# Patient Record
Sex: FEMALE | Race: AMERICAN INDIAN OR ALASKA NATIVE
[De-identification: names, ages, dates, MRNs, and addresses within clinical notes are randomized per-mention and may not be internally consistent; named-entity substitution may affect disease eponyms.]

---

## 2022-07-16 ENCOUNTER — HOSPITAL ENCOUNTER (EMERGENCY)
Dept: HOSPITAL 5 - ED | Age: 52
Discharge: HOME | End: 2022-07-16
Payer: SELF-PAY

## 2022-07-16 VITALS — DIASTOLIC BLOOD PRESSURE: 72 MMHG | SYSTOLIC BLOOD PRESSURE: 121 MMHG

## 2022-07-16 DIAGNOSIS — R51.9: Primary | ICD-10-CM

## 2022-07-16 DIAGNOSIS — Z98.890: ICD-10-CM

## 2022-07-16 DIAGNOSIS — I10: ICD-10-CM

## 2022-07-16 LAB
ALBUMIN SERPL-MCNC: 4.6 G/DL (ref 3.9–5)
ALT SERPL-CCNC: 13 UNITS/L (ref 7–56)
APTT BLD: 29.7 SEC. (ref 24.2–36.6)
BUN SERPL-MCNC: 9 MG/DL (ref 7–17)
BUN/CREAT SERPL: 11 %
CALCIUM SERPL-MCNC: 11.5 MG/DL (ref 8.4–10.2)
HCT VFR BLD CALC: 42.9 % (ref 30.3–42.9)
HEMOLYSIS INDEX: 10
HGB BLD-MCNC: 13.6 GM/DL (ref 10.1–14.3)
INR PPP: 0.93 (ref 0.87–1.13)
MCHC RBC AUTO-ENTMCNC: 32 % (ref 30–34)
MCV RBC AUTO: 78 FL (ref 79–97)
PLATELET # BLD: 234 K/MM3 (ref 140–440)
RBC # BLD AUTO: 5.47 M/MM3 (ref 3.65–5.03)

## 2022-07-16 PROCEDURE — 80053 COMPREHEN METABOLIC PANEL: CPT

## 2022-07-16 PROCEDURE — 99284 EMERGENCY DEPT VISIT MOD MDM: CPT

## 2022-07-16 PROCEDURE — 85730 THROMBOPLASTIN TIME PARTIAL: CPT

## 2022-07-16 PROCEDURE — 70450 CT HEAD/BRAIN W/O DYE: CPT

## 2022-07-16 PROCEDURE — 85610 PROTHROMBIN TIME: CPT

## 2022-07-16 PROCEDURE — 36415 COLL VENOUS BLD VENIPUNCTURE: CPT

## 2022-07-16 PROCEDURE — 85027 COMPLETE CBC AUTOMATED: CPT

## 2022-07-16 NOTE — CAT SCAN REPORT
CT HEAD WITHOUT CONTRAST



INDICATION: headache



TECHNIQUE: All CT scans at this location are performed using CT dose reduction for ALARA by means of 
automated exposure control. 



COMPARISON: 9/18/2010, report unavailable



FINDINGS:



BRAIN: No hemorrhage or mass effect are seen. No evidence of acute infarction is noted.



ORBITS: Normal as visualized.

SOFT TISSUES OF HEAD: Normal.

CALVARIUM: Normal.

VISUALIZED PARANASAL SINUSES AND MASTOID AIR CELLS: Clear.



ADDITIONAL FINDINGS: None.



IMPRESSION: No acute intracranial abnormality.



Signer Name: Buster Meneses MD 

Signed: 7/16/2022 2:42 PM

Workstation Name: [id number]

## 2022-07-16 NOTE — EMERGENCY DEPARTMENT REPORT
ED Headache HPI





- General


Chief Complaint: Headache


Stated Complaint: NUMBNESS ON THE RIGHT SIDE OF FACE


Source: patient, RN notes reviewed


Exam Limitations: no limitations





- History of Present Illness


Initial Comments: 


51-year-old female presents to the ED complaining of a headache x1 day.  She 

states that she was exercising on yesterday when she first noticed her 

unilateral right-sided headache with earache, weakness, funny feeling to her 

right side.  Patient states that symptom resolved.  She states that she feels 

dizzy when she noticed that she was bending down and came back.  Patient states 

that she feels like her left ear is full.  She states she does have sinus 

symptoms but she came to the ED out of concern symptom came back this a.m..  At 

present time patient has no symptom..  Patient denies any fever, denies any LOC,

denies taking anticoagulant at present time.





Allergies/Adverse Reactions: 


Allergies





No Known Allergies Allergy (Unverified 22 07:44)


   








Home Medications: 


Ambulatory Orders





Cetirizine HCl/Pseudoephedrine [Zyrtec-D Tablet] 1 each PO BID 15 Days #30 tab 

22 











ED Review of Systems


ROS: 


Stated complaint: NUMBNESS ON THE RIGHT SIDE OF FACE


Other details as noted in HPI





Constitutional: denies: chills, fever


Eyes: denies: eye pain, eye discharge, vision change


ENT: denies: ear pain, throat pain


Respiratory: denies: cough, shortness of breath, wheezing


Cardiovascular: denies: chest pain, palpitations


Endocrine: no symptoms reported


Gastrointestinal: denies: abdominal pain, nausea, diarrhea


Genitourinary: denies: urgency, dysuria, discharge


Musculoskeletal: denies: back pain, joint swelling, arthralgia


Skin: denies: rash, lesions


Neurological: denies: headache, weakness, paresthesias


Psychiatric: denies: anxiety, depression


Hematological/Lymphatic: denies: easy bleeding, easy bruising





ED Past Medical Hx





- Past Medical History


Previous Medical History?: Yes


Hx Hypertension: Yes





- Surgical History


Past Surgical History?: Yes


Additional Surgical History:  x 3





- Medications


Home Medications: 


                                Home Medications











 Medication  Instructions  Recorded  Confirmed  Last Taken  Type


 


Cetirizine HCl/Pseudoephedrine 1 each PO BID 15 Days #30 tab 22  Unknown 

Rx





[Zyrtec-D Tablet]     














ED Physical Exam





- General


Limitations: No Limitations


General appearance: alert, in no apparent distress





- Head


Head exam: Present: atraumatic, normocephalic





- Eye


Eye exam: Present: normal appearance





- ENT


ENT exam: Present: mucous membranes moist





- Neck


Neck exam: Present: normal inspection





- Respiratory


Respiratory exam: Present: normal lung sounds bilaterally.  Absent: respiratory 

distress





- Cardiovascular


Cardiovascular Exam: Present: regular rate, normal rhythm.  Absent: systolic 

murmur, diastolic murmur, rubs, gallop





- GI/Abdominal


GI/Abdominal exam: Present: soft, normal bowel sounds





- Extremities Exam


Extremities exam: Present: normal inspection





- Back Exam


Back exam: Present: normal inspection





- Neurological Exam


Neurological exam: Present: alert, oriented X3





- Psychiatric


Psychiatric exam: Present: normal affect, normal mood





- Skin


Skin exam: Present: warm, dry, intact, normal color.  Absent: rash





ED Course


                                   Vital Signs











  22





  07:45 13:22 15:21


 


Temperature 97.3 F L 97.9 F 98.0 F


 


Pulse Rate 54 L 55 L 60


 


Respiratory 20 18 18





Rate   


 


Blood Pressure  125/69 


 


Blood Pressure 126/79  121/72





[Right]   


 


O2 Sat by Pulse 100 100 99





Oximetry   














ED Medical Decision Making





- Lab Data


Result diagrams: 


                                 22 13:41





                                 22 13:41





- Radiology Data


[facility]  


                                     [address] [place] [address]  


 


                                          Cat Scan Report   


                                               Signed  


 


Patient: MIRIAM ROBB                                                     

           MR#: [id number]  


[id number]          


: 1970                                                                

Acct:[id number]      


 


Age/Sex: 51 / F                                                                

ADM Date: 22     


 


Loc: ED       


Attending Dr:   


 


 


Ordering Physician: OLAF PEREZ  


Date of Service: 22  


Procedure(s): CT head/brain wo con  


Accession Number(s): [id number]  


 


cc: OLAF PEREZ   


 


 


CT HEAD WITHOUT CONTRAST  


 


 INDICATION: headache  


 


 TECHNIQUE: All CT scans at this location are performed using CT dose reduction 

for ALARA by means 


of automated exposure control.   


 


 COMPARISON: 2010, report unavailable  


 


 FINDINGS:  


 


 BRAIN: No hemorrhage or mass effect are seen. No evidence of acute infarction 

is noted.  


 


 ORBITS: Normal as visualized.  


 SOFT TISSUES OF HEAD: Normal.  


 CALVARIUM: Normal.  


 VISUALIZED PARANASAL SINUSES AND MASTOID AIR CELLS: Clear.  


 


 ADDITIONAL FINDINGS: None.  


 


 IMPRESSION: No acute intracranial abnormality.  


 


 Signer Name: Buster Meneses MD   


 Signed: 2022 2:42 PM  


 Workstation Name: [id number]   


 


 


Transcribed By: AQUILES  


Dictated By: Buster Meneses MD  


Electronically Authenticated By: Buster Meneses MD    


Signed Date/Time: 22                                


 


 


 


DD/DT: 22                                                            

  


TD/TT:








- Medical Decision Making


51-year-old female presents to the ED complaining of a headache x1 day.  She 

states that she was exercising on yesterday when she first noticed her 

unilateral right-sided headache with earache, weakness, funny feeling to her 

right side.  Patient states that symptom resolved.  She states that she feels 

dizzy when she noticed that she was bending down and came back.  Patient states 

that she feels like her left ear is full.  She states she does have sinus 

symptoms but she came to the ED out of concern symptom came back this a.m..  At 

present time patient has no symptom..  Patient denies any fever, denies any LOC,

 denies taking anticoagulant at present time.  Patient states she is very active

 and works out daily. physical examination is unremarkable.  CT of the head 

showed no abnormality.





Rechecked the patient is resting  quietly and comfortable and feeling better. I 

discussed the results of diagnostic study, my clinical impression and the plan 

for further treatment with the patient. Patient agrees with plan and discharge 

at this present time. All question addressed.





I have given the patient instruction regarding a diagnosis ,expectation ,follow-

up and return precaution. I explained to the patient that emergent condition may

 arise and to return to the ED for new worsen and any new persisting condition. 

I have explained the importance of following up with the primary care physician 

or referral physician listed below has instructed. The patient verbalized 

understanding of discharge instruction.








                              Abnormal Lab Results











  22





  13:41 13:41 13:41


 


WBC    5.1


 


RBC    5.47 H


 


Hgb    13.6


 


Hct    42.9


 


MCV    78 L


 


MCH    25 L


 


MCHC    32


 


RDW    17.7 H


 


Plt Count    234


 


PT  13.5  


 


INR  0.93  


 


APTT  29.7  


 


Sodium   139 


 


Potassium   3.8 


 


Chloride   101.1 


 


Carbon Dioxide   27 


 


Anion Gap   15 


 


BUN   9 


 


Creatinine   0.8 


 


Estimated GFR   > 60 


 


BUN/Creatinine Ratio   11 


 


Glucose   94 


 


Calcium   11.5 H 


 


Total Bilirubin   0.30 


 


AST   16 


 


ALT   13 


 


Alkaline Phosphatase   78 


 


Total Protein   8.2 


 


Albumin   4.6 


 


Albumin/Globulin Ratio   1.3 








 








Rechecked the patient is resting quietly quietly and comfortable and feeling 

better. I discussed the results of diagnostic study, my clinical impression and 

the plan for further treatment with the patient. Patient agrees with plan and 

discharge at this present time. All question addressed.





I have given the patient instruction regarding a diagnosis ,expectation ,follow-

up and return precaution. I explained to the patient that emergent condition may

 arise and to return to the ED for new worsen and any new persisting condition. 

I have explained the importance of following up with the primary care physician 

or referral physician listed below has instructed. The patient verbalized 

understanding of discharge instruction.








Critical care attestation.: 


If time is entered above; I have spent that time in minutes in the direct care 

of this critically ill patient, excluding procedure time.








ED Disposition


Clinical Impression: 


Headache


Qualifiers:


 Headache type: unspecified Headache chronicity pattern: acute headache 

Intractability: not intractable Qualified Code(s): R51.9 - Headache, unspecified





Disposition: 01 HOME / SELF CARE / HOMELESS


Is pt being admited?: No


Does the pt Need Aspirin: No


Condition: Stable


Instructions:  Sinusitis, Adult, General Headache Without Cause, Easy-to-Read, 

Sinus Headache, Easy-to-Read


Additional Instructions: 


return to ED for any worsening symptom








Prescriptions: 


Cetirizine HCl/Pseudoephedrine [Zyrtec-D Tablet] 1 each PO BID 15 Days #30 tab


Referrals: 


ELEN ALCANTAR MD [Staff Physician] - 3-5 Days


Forms:  Work/School Release Form(ED)


Time of Disposition: 15:08